# Patient Record
Sex: FEMALE | Race: OTHER | HISPANIC OR LATINO | URBAN - METROPOLITAN AREA
[De-identification: names, ages, dates, MRNs, and addresses within clinical notes are randomized per-mention and may not be internally consistent; named-entity substitution may affect disease eponyms.]

---

## 2017-06-27 ENCOUNTER — EMERGENCY (EMERGENCY)
Facility: HOSPITAL | Age: 32
LOS: 1 days | Discharge: HOME | End: 2017-06-27
Attending: EMERGENCY MEDICINE
Payer: SELF-PAY

## 2017-06-27 VITALS
HEIGHT: 62 IN | RESPIRATION RATE: 18 BRPM | WEIGHT: 134.92 LBS | DIASTOLIC BLOOD PRESSURE: 61 MMHG | SYSTOLIC BLOOD PRESSURE: 108 MMHG | HEART RATE: 60 BPM | TEMPERATURE: 99 F | OXYGEN SATURATION: 99 %

## 2017-06-27 DIAGNOSIS — Z98.890 OTHER SPECIFIED POSTPROCEDURAL STATES: ICD-10-CM

## 2017-06-27 DIAGNOSIS — K91.89 OTHER POSTPROCEDURAL COMPLICATIONS AND DISORDERS OF DIGESTIVE SYSTEM: ICD-10-CM

## 2017-06-27 PROCEDURE — 36000 PLACE NEEDLE IN VEIN: CPT

## 2017-06-27 PROCEDURE — 99053 MED SERV 10PM-8AM 24 HR FAC: CPT

## 2017-06-27 PROCEDURE — 99284 EMERGENCY DEPT VISIT MOD MDM: CPT | Mod: 25

## 2017-06-27 PROCEDURE — 99282 EMERGENCY DEPT VISIT SF MDM: CPT

## 2017-06-27 NOTE — ED ADULT TRIAGE NOTE - CHIEF COMPLAINT QUOTE
pt requests to have drain/tubes removed from surgical wound site, states she had abdominoplasty surgery done in [facility] on May 19, states drain is not working and was told by her surgeon to have it removed, pt denies any fevers

## 2017-06-28 DIAGNOSIS — Z98.890 OTHER SPECIFIED POSTPROCEDURAL STATES: Chronic | ICD-10-CM

## 2017-06-28 NOTE — ED ADULT NURSE NOTE - OBJECTIVE STATEMENT
pt requests to have drain/tubes removed from surgical wound site, states she had abdominoplasty surgery done in [facility] on May 19, states drain is not working and was told by her surgeon to have it removed, pt denies any fevers

## 2017-06-28 NOTE — ED PROVIDER NOTE - MEDICAL DECISION MAKING DETAILS
33yo F w abdominoplaty 6 wks ago in [place] w drains in place. Was told by doc in [place] to have them removed. No fevers, abdom pains. No evidence of infection. Green fluid in drains. Will call Sx fro recommendations

## 2017-06-28 NOTE — ED PROVIDER NOTE - PROGRESS NOTE DETAILS
Dr Oreilly evaluated pt at bedside, discussed case w Dr Asif. Recommend f/u w Plastics in [place], or outpatient. D/w Dr Landa (plastics on call), willing to see in the office. Will DC, non need for acute intervention today

## 2017-06-28 NOTE — ED ADULT NURSE NOTE - CHIEF COMPLAINT QUOTE
pt requests to have drain/tubes removed from surgical wound site, states she had abdominoplasty surgery done in [facility] on May 19, states drain is not working and was told by her surgeon to have it removed, pt denies any fevers

## 2017-06-28 NOTE — CONSULT NOTE ADULT - SUBJECTIVE AND OBJECTIVE BOX
31 y/o female s/p abdominoplasty 1 month ago in [facility]; now with HV drain in suprapubic area; told by surgeon to go to ED for removal after pt decided to leave [facility] post-op to come to [place]  wound clean, intact; AVSS in ED  HV intact  discussed with Dr Asif  pt needs to follow up with her surgeon or a plastic surgeon for drain removal per Dr Asif

## 2017-06-28 NOTE — ED PROVIDER NOTE - ABDOMINAL EXAM
suprapubic subcutaneous drain, surgical wounds with nml healing, no signs of infections, green fluid within drain tubes

## 2017-06-28 NOTE — ED PROVIDER NOTE - OBJECTIVE STATEMENT
31 y/o female with no PMHx presents to the ED for a drain/tube removal s/p abdominoplasty. Pt notes she had to abdominoplasty done 5/19/17 in [place] and was told by the surgeon to go to the ED to have it removed. Pt denies fevers, chills, or any other complaints. NKDA.

## 2022-05-31 NOTE — ED ADULT NURSE NOTE - PAIN RATING/NUMBER SCALE (0-10): REST
Attending Physician Attestation Note:    Patient was seen with the resident physician, Abelardo Edouard DO.    The history and exam were discussed with Abelardo Edouard DO and I agree with the assessment and plan.  T2DM: start metformin today  Finger joint pain: bilateral. Working diagnosis of tenosynovitis.  Tingling in hands and feet: doubt diabetic peripheral neuropathy. Check B12 level today. Stretching of forearms.  Please see resident note for further details.    Paras Smith MD     0

## 2023-01-18 NOTE — ED ADULT NURSE NOTE - CAS EDP DISCH TYPE
Nothing to eat after midnight. Are you taking any blood thinners? When was the last day? Make sure to use Hibiclens prior to surgery. Remove any jewelry and body piercings. Do you wear glasses? If so, please bring a case to store them in. Are you having any Covid symptoms? Do you have any new rashes, infections, etc. that we should be aware of? Do you have a ride home the day of surgery? It cannot be a cab or medical transportation.   Verify surgery time and what time to arrive at hospital.       VM LEFT
Home